# Patient Record
(demographics unavailable — no encounter records)

---

## 2025-05-26 NOTE — HISTORY OF PRESENT ILLNESS
[Lower back] : lower back [9] : 9 [8] : 8 [Dull/Aching] : dull/aching [Radiating] : radiating [Shooting] : shooting [Tingling] : tingling [Constant] : constant [Standing] : standing [Walking] : walking [Stairs] : stairs [de-identified] : 39 y/o M with atraumatic low back pain x 1 week. Denies numbness/tingling. denies bladder or bowel issues. Pain relief with motrin. denies DM. [] : This patient has had an injection before: no [FreeTextEntry5] : Patient complains of low back pain since 1 week ago, pain worsened since, using a cane to ambulate. no prior hx

## 2025-05-26 NOTE — ASSESSMENT
[FreeTextEntry1] : A/P Lumbar spasm without radiculopathy - medrol dose la - muscle relaxant - NSAIDS - PT discussed - f/u spine/pain management

## 2025-05-26 NOTE — IMAGING
[de-identified] : PE lumbar spine: +tenderness to L paraspinals, no midline tenderness, limited motion due to pain, able to SLR with pain, motor and sensory intact, NVI [No bony abnormalities] : No bony abnormalities [Straightening consistent with spasm] : Straightening consistent with spasm [There are no fractures, subluxations or dislocations. No significant abnormalities are seen] : There are no fractures, subluxations or dislocations. No significant abnormalities are seen [AP] : anteroposterior

## 2025-06-13 NOTE — HISTORY OF PRESENT ILLNESS
[FreeTextEntry1] : Initial HPI    06/13/2025:     MRI independently reviewed: Conservative Care: Pain Medications: Past Injections: None Spine surgery: none Blood thinners: none